# Patient Record
Sex: MALE | Race: WHITE
[De-identification: names, ages, dates, MRNs, and addresses within clinical notes are randomized per-mention and may not be internally consistent; named-entity substitution may affect disease eponyms.]

---

## 2017-08-18 ENCOUNTER — HOSPITAL ENCOUNTER (INPATIENT)
Dept: HOSPITAL 31 - C.ER | Age: 62
LOS: 4 days | Discharge: HOME | DRG: 395 | End: 2017-08-22
Attending: HOSPITALIST | Admitting: HOSPITALIST
Payer: MEDICAID

## 2017-08-18 DIAGNOSIS — D64.9: Primary | ICD-10-CM

## 2017-08-18 DIAGNOSIS — Z91.19: ICD-10-CM

## 2017-08-18 DIAGNOSIS — I10: ICD-10-CM

## 2017-08-18 DIAGNOSIS — F03.90: ICD-10-CM

## 2017-08-18 DIAGNOSIS — D68.9: ICD-10-CM

## 2017-08-18 LAB
ALBUMIN/GLOB SERPL: 1.1 {RATIO} (ref 1–2.1)
ALP SERPL-CCNC: 76 U/L (ref 38–126)
ALT SERPL-CCNC: 21 U/L (ref 21–72)
APTT BLD: 29 SECONDS (ref 21–34)
AST SERPL-CCNC: 25 U/L (ref 17–59)
BASOPHILS # BLD AUTO: 0.1 K/UL (ref 0–0.2)
BASOPHILS NFR BLD: 2.4 % (ref 0–2)
BILIRUB SERPL-MCNC: 1 MG/DL (ref 0.2–1.3)
BUN SERPL-MCNC: 13 MG/DL (ref 9–20)
CALCIUM SERPL-MCNC: 9 MG/DL (ref 8.6–10.4)
CHLORIDE SERPL-SCNC: 105 MMOL/L (ref 98–107)
CO2 SERPL-SCNC: 24 MMOL/L (ref 22–30)
EOSINOPHIL # BLD AUTO: 0.1 K/UL (ref 0–0.7)
EOSINOPHIL NFR BLD: 2 % (ref 0–4)
ERYTHROCYTE [DISTWIDTH] IN BLOOD BY AUTOMATED COUNT: 20.2 % (ref 11.5–14.5)
FOLATE SERPL-MCNC: > 20 NG/ML
GLOBULIN SER-MCNC: 3.6 GM/DL (ref 2.2–3.9)
GLUCOSE SERPL-MCNC: 84 MG/DL (ref 75–110)
HCT VFR BLD CALC: 19.9 % (ref 35–51)
HCYS SERPL-SCNC: 7.3 UMOL/L (ref 6.6–14.8)
INR PPP: 1.4
IRON SERPL-MCNC: 13 UG/DL (ref 49–181)
LYMPHOCYTES # BLD AUTO: 0.9 K/UL (ref 1–4.3)
LYMPHOCYTES NFR BLD AUTO: 22.6 % (ref 20–40)
MCH RBC QN AUTO: 15.4 PG (ref 27–31)
MCHC RBC AUTO-ENTMCNC: 27.7 G/DL (ref 33–37)
MCV RBC AUTO: 55.7 FL (ref 80–94)
MONOCYTES # BLD: 0.3 K/UL (ref 0–0.8)
MONOCYTES NFR BLD: 8.1 % (ref 0–10)
NRBC BLD AUTO-RTO: 0.1 % (ref 0–2)
PLATELET # BLD: 213 K/UL (ref 130–400)
PMV BLD AUTO: 8.6 FL (ref 7.2–11.7)
POTASSIUM SERPL-SCNC: 3.2 MMOL/L (ref 3.6–5.2)
PROT SERPL-MCNC: 7.7 G/DL (ref 6.3–8.3)
SODIUM SERPL-SCNC: 143 MMOL/L (ref 132–148)
WBC # BLD AUTO: 4 K/UL (ref 4.8–10.8)

## 2017-08-18 PROCEDURE — 30233N1 TRANSFUSION OF NONAUTOLOGOUS RED BLOOD CELLS INTO PERIPHERAL VEIN, PERCUTANEOUS APPROACH: ICD-10-PCS | Performed by: HOSPITALIST
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
## 2017-08-18 NOTE — RAD
HISTORY:

anemia  



COMPARISON:

No prior. 



FINDINGS:



LUNGS:

No active pulmonary disease.



PLEURA:

No significant pleural effusion identified, no pneumothorax apparent.



CARDIOVASCULAR:

Normal.



OSSEOUS STRUCTURES:

No significant abnormalities.



VISUALIZED UPPER ABDOMEN:

Normal.



OTHER FINDINGS:

None.



IMPRESSION:

No active disease.

## 2017-08-18 NOTE — C.PDOC
History Of Present Illness


Patient is a 62 y/o male, accompanied by brother, is referred from the clinic 

for low hemoglobin level. Pt states that he visited the clinic yesterday 

because he was feeling dizzy, and had blood work done there. States he received 

a call from the clinic today, was told his hemoglobin level was 5.8g/dl, and 

told to report to an ER. Denies having history of low hemoglobin in the past. 

Pt also complains of headache at this time. Denies any blood in stool, nausea, 

vomiting, fever, or any other associated symptoms at this time. 





Time Seen by Provider: 08/18/17 10:18


Chief Complaint (Nursing): Abnormal Labs


History Per: Patient


History/Exam Limitations: no limitations


Onset/Duration Of Symptoms: Days


Current Symptoms Are (Timing): Still Present


Recent travel outside of the [place]: No


Additional History Per: Patient





Past Medical History


Reviewed: Historical Data, Nursing Documentation, Vital Signs


Vital Signs: 


 Last Vital Signs











Temp  98.9 F   08/18/17 10:12


 


Pulse  77   08/18/17 10:12


 


Resp  18   08/18/17 10:12


 


BP  133/78   08/18/17 10:12


 


Pulse Ox  99   08/18/17 11:22














- Medical History


PMH: HTN


Family History: States: No Known Family Hx





- Social History


Hx Alcohol Use: No


Hx Substance Use: No





Review Of Systems


Except As Marked, All Systems Reviewed And Found Negative.


Constitutional: Negative for: Fever, Chills


Cardiovascular: Negative for: Chest Pain, Palpitations


Respiratory: Negative for: Cough, Shortness of Breath


Gastrointestinal: Negative for: Nausea, Vomiting, Abdominal Pain, Hematochezia, 

Hematemesis


Skin: Negative for: Rash, Bruising


Neurological: Positive for: Headache, Dizziness.  Negative for: Weakness, 

Numbness





Physical Exam





- Physical Exam


Additional Physical Exam Comments: 


Constitutional: No acute distress.


Head: Normocephalic.  Atraumatic.  


Eyes:  PERRL. Conjunctival pallor.


ENT:  Moist mucous membranes.


Neck:  Supple.


Cardiovascular:  Regular rate. Radial pulses 2+ bilaterally.


Chest: No tenderness.


Respiratory:  Clear to auscultation bilaterally.


GI:  Soft. Nontender. Nondistended. 


Back:  No CVA tenderness.


Musculoskeletal:  No tenderness or swelling of extremities.


Skin:  No rash. 


Neurologic:  Alert, no focal deficit.








ED Course And Treatment





- Laboratory Results


Result Diagrams: 


 08/18/17 10:41





 08/18/17 10:41


O2 Sat by Pulse Oximetry: 99 (RA)


Pulse Ox Interpretation: Normal





Medical Decision Making


Medical Decision Making: 


Plan: Blood work, UA, CXR, EKG





EKG: Normal sinus rhythm at 60 bpm. No ST wave changes. 





CXR


HISTORY:


anemia  





COMPARISON:


No prior. 





FINDINGS:





LUNGS:


No active pulmonary disease.





PLEURA:


No significant pleural effusion identified, no pneumothorax apparent.





CARDIOVASCULAR:


Normal.





OSSEOUS STRUCTURES:


No significant abnormalities.





VISUALIZED UPPER ABDOMEN:


Normal.





OTHER FINDINGS:


None.





IMPRESSION:


No active disease.





Guaiac negative. Patient consented for blood transfusion and ordered. 





Disposition


Discussed With Dr.: Alex Brown


Doctor Will See Patient In The: Hospital





- Disposition


Disposition: HOSPITALIZED


Disposition Time: 11:29


Condition: FAIR





- Clinical Impression


Clinical Impression: 


 Symptomatic anemia








- Scribe Statement


The provider has reviewed the documentation as recorded by the Gloryibkellen Martel





All medical record entries made by the Gloryibkellen were at my direction and 

personally dictated by me. I have reviewed the chart and agree that the record 

accurately reflects my personal performance of the history, physical exam, 

medical decision making, and the department course for this patient. I have 

also personally directed, reviewed, and agree with the discharge instructions 

and disposition.

## 2017-08-19 LAB
ALBUMIN/GLOB SERPL: 1.2 {RATIO} (ref 1–2.1)
ALP SERPL-CCNC: 77 U/L (ref 38–126)
ALT SERPL-CCNC: 19 U/L (ref 21–72)
AST SERPL-CCNC: 17 U/L (ref 17–59)
BASOPHILS # BLD AUTO: 0.1 K/UL (ref 0–0.2)
BASOPHILS NFR BLD: 2.1 % (ref 0–2)
BILIRUB SERPL-MCNC: 0.7 MG/DL (ref 0.2–1.3)
BUN SERPL-MCNC: 11 MG/DL (ref 9–20)
CALCIUM SERPL-MCNC: 8.9 MG/DL (ref 8.6–10.4)
CHLORIDE SERPL-SCNC: 103 MMOL/L (ref 98–107)
CO2 SERPL-SCNC: 25 MMOL/L (ref 22–30)
EOSINOPHIL # BLD AUTO: 0.2 K/UL (ref 0–0.7)
EOSINOPHIL NFR BLD: 4.7 % (ref 0–4)
ERYTHROCYTE [DISTWIDTH] IN BLOOD BY AUTOMATED COUNT: 23 % (ref 11.5–14.5)
FOLATE SERPL-MCNC: 16.3 NG/ML
GLOBULIN SER-MCNC: 3.2 GM/DL (ref 2.2–3.9)
GLUCOSE SERPL-MCNC: 93 MG/DL (ref 75–110)
HCT VFR BLD CALC: 23.8 % (ref 35–51)
LYMPHOCYTES # BLD AUTO: 1.2 K/UL (ref 1–4.3)
LYMPHOCYTES NFR BLD AUTO: 25.9 % (ref 20–40)
MCH RBC QN AUTO: 16.8 PG (ref 27–31)
MCHC RBC AUTO-ENTMCNC: 28.6 G/DL (ref 33–37)
MCV RBC AUTO: 58.7 FL (ref 80–94)
MONOCYTES # BLD: 0.3 K/UL (ref 0–0.8)
MONOCYTES NFR BLD: 7 % (ref 0–10)
NRBC BLD AUTO-RTO: 0 % (ref 0–2)
PLATELET # BLD: 206 K/UL (ref 130–400)
PMV BLD AUTO: 9.3 FL (ref 7.2–11.7)
POTASSIUM SERPL-SCNC: 3.4 MMOL/L (ref 3.6–5.2)
PROT SERPL-MCNC: 7 G/DL (ref 6.3–8.3)
PROT SERPL-MCNC: 7.3 G/DL (ref 6.1–8.1)
SODIUM SERPL-SCNC: 140 MMOL/L (ref 132–148)
WBC # BLD AUTO: 4.8 K/UL (ref 4.8–10.8)

## 2017-08-19 NOTE — CP.PCM.PN
<Xu Denise H - Last Filed: 08/19/17 21:07>





Subjective





- Date & Time of Evaluation


Date of Evaluation: 08/19/17


Time of Evaluation: 11:40





- Subjective


Subjective: 











Dr. Brown service:





He is in bed receiving a blood transfusion.  He has no complaints of fatigue, 

chest pain, shortness of breath, blood in stool, hematuria, fever or chills.   





Objective





- Vital Signs/Intake and Output


Vital Signs (last 24 hours): 


 











Temp Pulse Resp BP Pulse Ox


 


 97.6 F   60   20   154/88 H  100 


 


 08/19/17 15:15  08/19/17 15:15  08/19/17 15:15  08/19/17 15:15  08/19/17 15:15








Intake and Output: 


 











 08/19/17 08/20/17





 18:59 06:59


 


Intake Total 675 


 


Balance 675 














- Medications


Medications: 


 Current Medications





Acetaminophen (Tylenol 325mg Tab)  650 mg PO Q6 PRN


   PRN Reason: Fever >100.4 F


Famotidine (Pepcid)  20 mg PO BID [facility]


   Last Admin: 08/19/17 17:41 Dose:  20 mg











- Labs


Labs: 


 





 08/19/17 06:32 





 08/19/17 06:32 





 











PT  15.7 SECONDS (9.7-12.2)  H  08/18/17  10:41    


 


INR  1.4   08/18/17  10:41    


 


APTT  29 SECONDS (21-34)   08/18/17  10:41    














- Constitutional


Appears: Non-toxic, No Acute Distress





- Head Exam


Head Exam: NORMAL INSPECTION





- Eye Exam


Eye Exam: Normal appearance, PERRL.  absent: Nystagmus, Scleral icterus


Pupil Exam: NORMAL ACCOMODATION





- Respiratory Exam


Respiratory Exam: Clear to Ausculation Bilateral.  absent: Rales, Rhonchi, 

Wheezes





- Cardiovascular Exam


Cardiovascular Exam: REGULAR RHYTHM, RRR, +S1, +S2.  absent: Gallop, Rubs





- GI/Abdominal Exam


GI & Abdominal Exam: Soft, Normal Bowel Sounds.  absent: Tenderness





- Extremities Exam


Extremities Exam: Normal Inspection.  absent: Pedal Edema





- Back Exam


Back Exam: NORMAL INSPECTION





- Neurological Exam


Neurological Exam: Alert





- Psychiatric Exam


Psychiatric exam: Normal Affect, Normal Mood





- Skin


Skin Exam: Pallor.  absent: Normal Color





Assessment and Plan





- Assessment and Plan (Free Text)


Assessment: 





Anemia


8/19: Received 2 units today, follow up cbc, and Dr. White's recs.  Iron is low 

and so is % saturation, suggest iron deficiency anemia.  





Hgb in the ED 5.5


-Patient refused blood transfusion


-Occult blood negative in the ED, repeats pending


-Heme/onc consult, Dr. White help appreciated


-Follow up Iron studies, homocysteine, B12, folate, protein electrophoresis





HTN


-Noncompliance to home meds


-Hold home hypertensive, BP is in the normal range





Prophylactic measures


-Pepcid


-SCD


-Tylenol


-Full code





Plan: 





Anemia


-Hgb in the ED 5.5


-Patient refused blood transfusion


-Occult blood negative in the ED, repeats pending


-Heme/onc consult, Dr. White help appreciated


-Follow up Iron studies, homocysteine, B12, folate, protein electrophoresis





HTN


-Noncompliance to home meds


-Hold home hypertensive, BP is in the normal range





Prophylactic measures


-Pepcid


-SCD


-Tylenol


-Full code





<Alex Brown - Last Filed: 08/20/17 14:35>





Objective





- Vital Signs/Intake and Output


Vital Signs (last 24 hours): 


 











Temp Pulse Resp BP Pulse Ox


 


 98 F   74   20   166/89 H  100 


 


 08/20/17 09:32  08/20/17 09:32  08/20/17 09:32  08/20/17 09:32  08/20/17 09:32








Intake and Output: 


 











 08/20/17 08/20/17





 06:59 18:59


 


Intake Total 550 


 


Output Total 1100 


 


Balance -550 














- Medications


Medications: 


 Current Medications





Acetaminophen (Tylenol 325mg Tab)  650 mg PO Q6 PRN


   PRN Reason: Fever >100.4 F


Famotidine (Pepcid)  20 mg PO BID [facility]


   Last Admin: 08/20/17 11:00 Dose:  20 mg


Ferric Sodium Gluconate Complex (Ferrlecit)  125 mg IVPB DAILY [facility]


   Last Admin: 08/20/17 11:01 Dose:  125 mg


Haloperidol Lactate (Haldol)  2 mg IM Q6H PRN


   PRN Reason: Agitation


   Last Admin: 08/20/17 11:49 Dose:  2 mg











- Labs


Labs: 


 





 08/20/17 11:41 





 08/20/17 11:41 





 











PT  15.7 SECONDS (9.7-12.2)  H  08/18/17  10:41    


 


INR  1.4   08/18/17  10:41    


 


APTT  29 SECONDS (21-34)   08/18/17  10:41    














Attending/Attestation





- Attestation


I have personally seen and examined this patient.: Yes


I have fully participated in the care of the patient.: Yes


I have reviewed all pertinent clinical information, including history, physical 

exam and plan: Yes


Notes (Text): 





08/20/17 14:35


Patient was seen and examined at bedside


Receiving 2 units of blood transfusion


We will follow up CBC


Anemia workup in progress


We have requested a hematology evaluation


Discussed the plan of care with the resident and agree with the assessment and 

plan documented.
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
## 2017-08-19 NOTE — CP.PCM.PN
Subjective





- Date & Time of Evaluation


Date of Evaluation: 08/19/17


Time of Evaluation: 17:00





- Subjective


Subjective: 





Feeling better





Objective





- Vital Signs/Intake and Output


Vital Signs (last 24 hours): 


 











Temp Pulse Resp BP Pulse Ox


 


 97.6 F   60   20   154/88 H  100 


 


 08/19/17 15:15  08/19/17 15:15  08/19/17 15:15  08/19/17 15:15  08/19/17 15:15








Intake and Output: 


 











 08/19/17 08/20/17





 18:59 06:59


 


Intake Total 675 


 


Balance 675 














- Medications


Medications: 


 Current Medications





Acetaminophen (Tylenol 325mg Tab)  650 mg PO Q6 PRN


   PRN Reason: Fever >100.4 F


Famotidine (Pepcid)  20 mg PO BID [facility]


   Last Admin: 08/19/17 17:41 Dose:  20 mg


Ferric Sodium Gluconate Complex (Ferrlecit)  125 mg IVPB DAILY [facility]











- Labs


Labs: 


 





 08/19/17 06:32 





 08/19/17 06:32 





 











PT  15.7 SECONDS (9.7-12.2)  H  08/18/17  10:41    


 


INR  1.4   08/18/17  10:41    


 


APTT  29 SECONDS (21-34)   08/18/17  10:41    














- Head Exam


Head Exam: ATRAUMATIC





- Eye Exam


Eye Exam: Normal appearance





- ENT Exam


ENT Exam: Mucous Membranes Dry





- Respiratory Exam


Respiratory Exam: NORMAL BREATHING PATTERN





- Cardiovascular Exam


Cardiovascular Exam: +S1, +S2





- GI/Abdominal Exam


GI & Abdominal Exam: Normal Bowel Sounds





- Extremities Exam


Extremities Exam: Normal Inspection





Assessment and Plan


(1) Anemia


Assessment & Plan: 


work up consistent with iron deficiency


FOBT negative; outpatient GI w/u


recommend checking UA


will start IV iron


transfusion support for goal HGB > 8





Status: Acute   





(2) Coagulopathy


Status: Acute

## 2017-08-20 LAB
ALBUMIN/GLOB SERPL: 1.1 {RATIO} (ref 1–2.1)
ALP SERPL-CCNC: 92 U/L (ref 38–126)
ALT SERPL-CCNC: 26 U/L (ref 21–72)
AST SERPL-CCNC: 20 U/L (ref 17–59)
BASOPHILS # BLD AUTO: 0.1 K/UL (ref 0–0.2)
BASOPHILS NFR BLD: 1.3 % (ref 0–2)
BILIRUB SERPL-MCNC: 1.3 MG/DL (ref 0.2–1.3)
BUN SERPL-MCNC: 11 MG/DL (ref 9–20)
CALCIUM SERPL-MCNC: 9.9 MG/DL (ref 8.6–10.4)
CHLORIDE SERPL-SCNC: 101 MMOL/L (ref 98–107)
CO2 SERPL-SCNC: 26 MMOL/L (ref 22–30)
EOSINOPHIL # BLD AUTO: 0.1 K/UL (ref 0–0.7)
EOSINOPHIL NFR BLD: 1.3 % (ref 0–4)
ERYTHROCYTE [DISTWIDTH] IN BLOOD BY AUTOMATED COUNT: 26.5 % (ref 11.5–14.5)
GLOBULIN SER-MCNC: 4.3 GM/DL (ref 2.2–3.9)
GLUCOSE SERPL-MCNC: 84 MG/DL (ref 75–110)
HCT VFR BLD CALC: 28.9 % (ref 35–51)
LYMPHOCYTES # BLD AUTO: 1.3 K/UL (ref 1–4.3)
LYMPHOCYTES NFR BLD AUTO: 19.7 % (ref 20–40)
MCH RBC QN AUTO: 18.1 PG (ref 27–31)
MCHC RBC AUTO-ENTMCNC: 29.9 G/DL (ref 33–37)
MCV RBC AUTO: 60.7 FL (ref 80–94)
MONOCYTES # BLD: 0.4 K/UL (ref 0–0.8)
MONOCYTES NFR BLD: 6.1 % (ref 0–10)
NRBC BLD AUTO-RTO: 0 % (ref 0–2)
PLATELET # BLD: 254 K/UL (ref 130–400)
PMV BLD AUTO: 8.7 FL (ref 7.2–11.7)
POTASSIUM SERPL-SCNC: 3.7 MMOL/L (ref 3.6–5.2)
PROT SERPL-MCNC: 8.9 G/DL (ref 6.3–8.3)
SODIUM SERPL-SCNC: 143 MMOL/L (ref 132–148)
WBC # BLD AUTO: 6.8 K/UL (ref 4.8–10.8)

## 2017-08-20 RX ADMIN — SODIUM FERRIC GLUCONATE COMPLEX SCH MG: 12.5 INJECTION INTRAVENOUS at 11:01

## 2017-08-20 NOTE — PCM.PSYCH
Initial Psychiatric Evaluation





- Initial Psychiatric Evaluation


Type of Admission: Voluntary


Legal Status: Capacity


Chief Complaint (in patient's own words): 





I am home, waiting for my kids to come.


Current Medications: 





Active Medications











Generic Name Dose Route Start Last Admin





  Trade Name Freq  PRN Reason Stop Dose Admin


 


Acetaminophen  650 mg  08/18/17 15:59  





  Tylenol 325mg Tab  PO   





  Q6 PRN   





  Fever >100.4 F   


 


Famotidine  20 mg  08/18/17 18:00  08/20/17 18:26





  Pepcid  PO   20 mg





  BID [facility]   Administration


 


Ferric Sodium Gluconate Complex  125 mg  08/19/17 21:30  08/20/17 11:01





  Ferrlecit  IVPB   125 mg





  DAILY [facility]   Administration


 


Haloperidol Lactate  2 mg  08/20/17 10:44  08/20/17 11:49





  Haldol  IM   2 mg





  Q6H PRN   Administration





  Agitation   














Past Psychiatric History





- Past Psychiatric History


Pertinent Medical Hx (Current Medical&Sleep Prob, Allergies): 





 Allergies











Allergy/AdvReac Type Severity Reaction Status Date / Time


 


No Known Allergies Allergy   Verified 08/18/17 10:17








 





Amlodipine Besylate/Benazepril [Lotrel 10 mg-40 mg] 1 cap PO DAILY 08/18/17 


Propranolol [Inderal] 40 mg PO DAILY 08/18/17

## 2017-08-20 NOTE — CP.PCM.PN
<Alex Brown M - Last Filed: 08/20/17 16:46>





Objective





- Vital Signs/Intake and Output


Vital Signs (last 24 hours): 


 











Temp Pulse Resp BP Pulse Ox


 


 98.4 F   70   18   152/78 H  98 


 


 08/20/17 15:39  08/20/17 15:39  08/20/17 15:39  08/20/17 15:39  08/20/17 15:39








Intake and Output: 


 











 08/20/17 08/20/17





 06:59 18:59


 


Intake Total 550 


 


Output Total 1100 


 


Balance -550 














- Medications


Medications: 


 Current Medications





Acetaminophen (Tylenol 325mg Tab)  650 mg PO Q6 PRN


   PRN Reason: Fever >100.4 F


Famotidine (Pepcid)  20 mg PO BID [facility]


   Last Admin: 08/20/17 11:00 Dose:  20 mg


Ferric Sodium Gluconate Complex (Ferrlecit)  125 mg IVPB DAILY [facility]


   Last Admin: 08/20/17 11:01 Dose:  125 mg


Haloperidol Lactate (Haldol)  2 mg IM Q6H PRN


   PRN Reason: Agitation


   Last Admin: 08/20/17 11:49 Dose:  2 mg











- Labs


Labs: 


 





 08/20/17 11:41 





 08/20/17 11:41 





 











PT  15.7 SECONDS (9.7-12.2)  H  08/18/17  10:41    


 


INR  1.4   08/18/17  10:41    


 


APTT  29 SECONDS (21-34)   08/18/17  10:41    














Attending/Attestation





- Attestation


I have personally seen and examined this patient.: Yes


I have fully participated in the care of the patient.: Yes


I have reviewed all pertinent clinical information, including history, physical 

exam and plan: Yes


Notes (Text): 





08/20/17 16:47


Patient was seen and examined at bedside today


Patient appears confused


He is status post 2 units of PRBC. Follow-up a CBC


We will transfuse further if needed


The confusion and change in mental status appears to be secondary to hospital 

delirium


We will monitor the patientand order Haldol as needed


We will also request psychiatry evaluation for the patient


I discussed the plan of care with the resident and agree with the assessment 

and plan documented above.





<Xu Denise H - Last Filed: 08/20/17 21:32>





Subjective





- Date & Time of Evaluation


Date of Evaluation: 08/20/17


Time of Evaluation: 10:00





- Subjective


Subjective: 





Dr. Brown service:





Patient seen in room.  He is very confused and disoriented.  He does not know 

where is at and why is here.  He is also unoriented the year, month, and day of 

the week.  Unable to obtain history from patient.  Spoke with family in room 

who says patient is often confused at home.  They said he lives alone although 

he has a wife and children in [place].  





Objective





- Vital Signs/Intake and Output


Vital Signs (last 24 hours): 


 











Temp Pulse Resp BP Pulse Ox


 


 98 F   74   20   166/89 H  100 


 


 08/20/17 09:32  08/20/17 09:32  08/20/17 09:32  08/20/17 09:32  08/20/17 09:32








Intake and Output: 


 











 08/20/17 08/20/17





 06:59 18:59


 


Intake Total 550 


 


Output Total 1100 


 


Balance -550 














- Medications


Medications: 


 Current Medications





Acetaminophen (Tylenol 325mg Tab)  650 mg PO Q6 PRN


   PRN Reason: Fever >100.4 F


Famotidine (Pepcid)  20 mg PO BID [facility]


   Last Admin: 08/20/17 11:00 Dose:  20 mg


Ferric Sodium Gluconate Complex (Ferrlecit)  125 mg IVPB DAILY [facility]


   Last Admin: 08/20/17 11:01 Dose:  125 mg


Haloperidol Lactate (Haldol)  2 mg IM Q6H PRN


   PRN Reason: Agitation


   Last Admin: 08/20/17 11:49 Dose:  2 mg











- Labs


Labs: 


 





 08/20/17 11:41 





 08/20/17 11:41 





 











PT  15.7 SECONDS (9.7-12.2)  H  08/18/17  10:41    


 


INR  1.4   08/18/17  10:41    


 


APTT  29 SECONDS (21-34)   08/18/17  10:41    














- Constitutional


Appears: Confused





- Eye Exam


Eye Exam: Normal appearance





- Respiratory Exam


Respiratory Exam: Clear to Ausculation Bilateral.  absent: Rales, Rhonchi, 

Wheezes





- Cardiovascular Exam


Cardiovascular Exam: REGULAR RHYTHM, RRR, +S1, +S2.  absent: Gallop, Rubs





- GI/Abdominal Exam


GI & Abdominal Exam: Soft, Normal Bowel Sounds.  absent: Tenderness





- Extremities Exam


Extremities Exam: Normal Inspection.  absent: Pedal Edema





- Neurological Exam


Neurological Exam: absent: Oriented x3





- Psychiatric Exam


Psychiatric exam: Anxious





- Skin


Skin Exam: Warm.  absent: Normal Color





Assessment and Plan





- Assessment and Plan (Free Text)


Assessment: 





Delirium:


Psych consulted, most likely hospital acquired delrium.  Haldol prn.  Spoke 

with family who said he has been diagnosed with dementia in the past. 





Anemia


8/20:  Hbg is no 8.4, IV Ferrict for low iron


8/19: Received 2 units today, follow up cbc, and Dr. White's recs.  Iron is low 

and so is % saturation, suggest iron deficiency anemia.  





Hgb in the ED 5.5


-Patient refused blood transfusion


-Occult blood negative in the ED, repeats pending


-Heme/onc consult, Dr. White help appreciated


-Follow up Iron studies, homocysteine, B12, folate, protein electrophoresis





HTN


-Noncompliance to home meds


-Hold home hypertensive, BP is in the normal range





Prophylactic measures


-Pepcid


-SCD


-Tylenol


-Full code

## 2017-08-21 LAB
ALBUMIN/GLOB SERPL: 1.1 {RATIO} (ref 1–2.1)
ALP SERPL-CCNC: 80 U/L (ref 38–126)
ALT SERPL-CCNC: 23 U/L (ref 21–72)
AST SERPL-CCNC: 18 U/L (ref 17–59)
BASOPHILS # BLD AUTO: 0.1 K/UL (ref 0–0.2)
BASOPHILS NFR BLD: 1.8 % (ref 0–2)
BILIRUB SERPL-MCNC: 0.9 MG/DL (ref 0.2–1.3)
BUN SERPL-MCNC: 11 MG/DL (ref 9–20)
CALCIUM SERPL-MCNC: 9.5 MG/DL (ref 8.6–10.4)
CHLORIDE SERPL-SCNC: 101 MMOL/L (ref 98–107)
CO2 SERPL-SCNC: 26 MMOL/L (ref 22–30)
EOSINOPHIL # BLD AUTO: 0.2 K/UL (ref 0–0.7)
EOSINOPHIL NFR BLD: 3.1 % (ref 0–4)
ERYTHROCYTE [DISTWIDTH] IN BLOOD BY AUTOMATED COUNT: 26.2 % (ref 11.5–14.5)
GLOBULIN SER-MCNC: 3.7 GM/DL (ref 2.2–3.9)
GLUCOSE SERPL-MCNC: 98 MG/DL (ref 75–110)
HCT VFR BLD CALC: 26.3 % (ref 35–51)
LYMPHOCYTES # BLD AUTO: 1.5 K/UL (ref 1–4.3)
LYMPHOCYTES NFR BLD AUTO: 25.2 % (ref 20–40)
MCH RBC QN AUTO: 18.2 PG (ref 27–31)
MCHC RBC AUTO-ENTMCNC: 29.9 G/DL (ref 33–37)
MCV RBC AUTO: 60.9 FL (ref 80–94)
MONOCYTES # BLD: 0.5 K/UL (ref 0–0.8)
MONOCYTES NFR BLD: 9.5 % (ref 0–10)
NRBC BLD AUTO-RTO: 0 % (ref 0–2)
PLATELET # BLD: 235 K/UL (ref 130–400)
PMV BLD AUTO: 8.8 FL (ref 7.2–11.7)
POTASSIUM SERPL-SCNC: 4 MMOL/L (ref 3.6–5.2)
PROT SERPL-MCNC: 7.7 G/DL (ref 6.3–8.3)
SODIUM SERPL-SCNC: 142 MMOL/L (ref 132–148)
WBC # BLD AUTO: 5.8 K/UL (ref 4.8–10.8)

## 2017-08-21 RX ADMIN — SODIUM FERRIC GLUCONATE COMPLEX SCH MG: 12.5 INJECTION INTRAVENOUS at 10:21
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
## 2017-08-22 VITALS
TEMPERATURE: 98.2 F | RESPIRATION RATE: 18 BRPM | DIASTOLIC BLOOD PRESSURE: 86 MMHG | HEART RATE: 65 BPM | OXYGEN SATURATION: 98 % | SYSTOLIC BLOOD PRESSURE: 157 MMHG

## 2017-08-22 LAB
BASOPHILS # BLD AUTO: 0.1 K/UL (ref 0–0.2)
BASOPHILS NFR BLD: 2 % (ref 0–2)
BETA1 GLOB FLD ELPH-MCNC: 0.5 G/DL (ref 0.4–0.6)
BETA2 GLOB FLD ELPH-MCNC: 0.3 G/DL (ref 0.2–0.5)
EOSINOPHIL # BLD AUTO: 0.3 K/UL (ref 0–0.7)
EOSINOPHIL NFR BLD: 5.2 % (ref 0–4)
ERYTHROCYTE [DISTWIDTH] IN BLOOD BY AUTOMATED COUNT: 26.3 % (ref 11.5–14.5)
GAMMA GLOB SERPL ELPH-MCNC: 1.6 G/DL (ref 0.8–1.7)
HCT VFR BLD CALC: 26.8 % (ref 35–51)
LYMPHOCYTES # BLD AUTO: 1.4 K/UL (ref 1–4.3)
LYMPHOCYTES NFR BLD AUTO: 23.3 % (ref 20–40)
MCH RBC QN AUTO: 18.1 PG (ref 27–31)
MCHC RBC AUTO-ENTMCNC: 29.6 G/DL (ref 33–37)
MCV RBC AUTO: 61.1 FL (ref 80–94)
MONOCYTES # BLD: 0.6 K/UL (ref 0–0.8)
MONOCYTES NFR BLD: 9.6 % (ref 0–10)
NRBC BLD AUTO-RTO: 0 % (ref 0–2)
PLATELET # BLD: 215 K/UL (ref 130–400)
PMV BLD AUTO: 8.6 FL (ref 7.2–11.7)
WBC # BLD AUTO: 6 K/UL (ref 4.8–10.8)

## 2017-08-22 RX ADMIN — SODIUM FERRIC GLUCONATE COMPLEX SCH MG: 12.5 INJECTION INTRAVENOUS at 10:08

## 2017-08-22 NOTE — CP.PCM.PN
Subjective





- Date & Time of Evaluation


Date of Evaluation: 08/21/17


Time of Evaluation: 19:00





- Subjective


Subjective: 





No complaints





Objective





- Vital Signs/Intake and Output


Vital Signs (last 24 hours): 


 











Temp Pulse Resp BP Pulse Ox


 


 98.0 F   58 L  20   145/83   97 


 


 08/22/17 00:05  08/22/17 00:05  08/22/17 00:05  08/22/17 00:05  08/22/17 00:05











- Medications


Medications: 


 Current Medications





Acetaminophen (Tylenol 325mg Tab)  650 mg PO Q6 PRN


   PRN Reason: Fever >100.4 F


   Last Admin: 08/21/17 10:21 Dose:  650 mg


Famotidine (Pepcid)  20 mg PO BID [facility]


   Last Admin: 08/21/17 17:04 Dose:  20 mg


Ferric Sodium Gluconate Complex (Ferrlecit)  125 mg IVPB DAILY [facility]


   Last Admin: 08/21/17 10:21 Dose:  125 mg


Haloperidol Lactate (Haldol)  2 mg IM Q6H PRN


   PRN Reason: Agitation


   Last Admin: 08/20/17 11:49 Dose:  2 mg











- Labs


Labs: 


 





 08/21/17 12:21 





 08/21/17 12:21 





 











PT  15.7 SECONDS (9.7-12.2)  H  08/18/17  10:41    


 


INR  1.4   08/18/17  10:41    


 


APTT  29 SECONDS (21-34)   08/18/17  10:41    














- Head Exam


Head Exam: ATRAUMATIC





- Eye Exam


Eye Exam: Normal appearance





- ENT Exam


ENT Exam: Mucous Membranes Dry





- Respiratory Exam


Respiratory Exam: NORMAL BREATHING PATTERN





- Cardiovascular Exam


Cardiovascular Exam: +S1, +S2





- Extremities Exam


Extremities Exam: Normal Inspection





Assessment and Plan


(1) Anemia


Assessment & Plan: 


iron deficiency anemia


outpatient GI work up


IV ferrlecit


transfusion support PRN


Status: Acute   





(2) Coagulopathy


Status: Acute
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
## 2017-08-22 NOTE — CARD
--------------- APPROVED REPORT --------------





EKG Measurement

Heart [id number]

[id number]

[id number]

[id number]

[id number]



<Conclusion>

Sinus bradycardia

Otherwise normal ECG

## 2017-11-11 ENCOUNTER — HOSPITAL ENCOUNTER (EMERGENCY)
Dept: HOSPITAL 31 - C.ER | Age: 62
Discharge: HOME | End: 2017-11-11
Payer: MEDICAID

## 2017-11-11 VITALS — OXYGEN SATURATION: 99 % | RESPIRATION RATE: 18 BRPM | TEMPERATURE: 97.5 F

## 2017-11-11 VITALS — HEART RATE: 59 BPM | DIASTOLIC BLOOD PRESSURE: 80 MMHG | SYSTOLIC BLOOD PRESSURE: 139 MMHG

## 2017-11-11 DIAGNOSIS — R51: Primary | ICD-10-CM
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
## 2017-11-11 DIAGNOSIS — S05.12XA: ICD-10-CM

## 2017-11-11 NOTE — CT
PROCEDURE:  CT HEAD WITHOUT CONTRAST.



HISTORY:

Headache 



COMPARISON:

None available. 



TECHNIQUE:

Axial computed tomography images were obtained through the head/brain 

without intravenous contrast.  



Radiation dose:



Total exam DLP = 950.50 mGy-cm.



This CT exam was performed using one or more of the following dose 

reduction techniques: Automated exposure control, adjustment of the 

mA and/or kV according to patient size, and/or use of iterative 

reconstruction technique.



FINDINGS:



HEMORRHAGE:

No intracranial hemorrhage. 



BRAIN:

Gray-white matter differentiation is preserved.  There is no mass, 

mass effect or abnormal extra-axial fluid collection.



VENTRICLES:

There is moderate age-related global parenchymal volume loss and 

proportionate enlargement of the ventricles and cortical sulci. 



CALVARIUM:

The skull base and calvarium are normal.



PARANASAL SINUSES:

Predominantly clear.



MASTOID AIR CELLS:

Predominantly clear.



OTHER FINDINGS:

None.



IMPRESSION:

No acute intracranial abnormality.  



Moderate age-related global parenchymal volume loss.

## 2017-11-11 NOTE — C.PDOC
History Of Present Illness





60 y/o male with a hx of HTN, c/o headache, left eye redness, and pain after 

waking up this morning. Patient does not recall having trauma to the left eye. 

No fever, chills, or URI symptoms. pt reports h/o of previous tbi. 





Time Seen by Provider: 11/11/17 13:06


Chief Complaint (Nursing): Eye Problem


History Per: Patient


History/Exam Limitations: no limitations


Onset/Duration Of Symptoms: Hrs (This morning)


Current Symptoms Are (Timing): Still Present


Injury To Eye?: No


Severity: Mild


Quality: "Pain"


Associated Symptoms: Pain.  denies: Decreased Vision, Discharge From Eye


Recent travel outside of the [place]: No


Additional History Per: Patient





Past Medical History


Reviewed: Historical Data, Nursing Documentation, Vital Signs


Vital Signs: 


 Last Vital Signs











Temp  97.5 F L  11/11/17 12:55


 


Pulse  59 L  11/11/17 14:16


 


Resp  18   11/11/17 14:16


 


BP  139/80   11/11/17 14:16


 


Pulse Ox  99   11/11/17 14:16














- Medical History


PMH: Anemia, HTN


   Denies: Chronic Kidney Disease





- [employer] Procedures








TRANSFUSE NONAUT RED BLOOD CELLS IN PERIPH VEIN, PERC (08/18/17)








Family History: States: Unknown Family Hx





- Social History


Hx Alcohol Use: No


Hx Substance Use: No





- Immunization History


Hx Tetanus Toxoid Vaccination: Yes


Hx Influenza Vaccination: No


Hx Pneumococcal Vaccination: No





Review Of Systems


Except As Marked, All Systems Reviewed And Found Negative.


Constitutional: Negative for: Fever, Chills


Eyes: Positive for: Pain (LEft), Redness (left)


ENT: Negative for: Nose Discharge, Nose Congestion, Throat Pain


Respiratory: Negative for: Cough


Skin: Negative for: Rash


Neurological: Positive for: Headache.  Negative for: Weakness, Numbness





Physical Exam





- Physical Exam


Appears: Non-toxic, No Acute Distress


Skin: Warm, Dry


Head: Atraumatic, Normacephalic


Eye(s): bilateral: PERRL, EOMI, right: Normal Inspection, left: Other (Erythema 

to the left eye. No discharge.)


Ear(s): Bilateral: Normal


Oral Mucosa: Moist


Throat: Normal, No Erythema


Chest: Symmetrical


Cardiovascular: Rhythm Regular, No Murmur


Respiratory: Normal Breath Sounds, No Rales, No Rhonchi, No Wheezing


Gastrointestinal/Abdominal: Soft, No Tenderness


Neurological/Psych: Oriented x3, Normal Motor, Normal Sensation





ED Course And Treatment


O2 Sat by Pulse Oximetry: 99 (RA)


Pulse Ox Interpretation: Normal





Medical Decision Making


Medical Decision Making: 


suspect subconjuctival hematoma, will check visual acuity, ct head, ocular 

pressure. 


Plans:


* CT Head w/o


* Tylenol


* Tetracaine


visual acuity to left eye limited 2/2 previous tbi, unable to follow 

instructions-. ocular pressure 15 (95%)





Disposition





- Disposition


Referrals: 


Michael Pan [Staff Provider] - 


Paco Ron MD [Staff Provider] - 


Disposition: HOME/ ROUTINE


Disposition Time: 14:14


Condition: STABLE


Additional Instructions: 


please follow up with your doctor. return to er with worsening symptoms or 

concerns. 


Instructions:  Subconjunctival Hemorrhage (ED), Acute Headache (ED)


Forms:  [facility] (English)





- Clinical Impression


Clinical Impression: 


 Headache, Subconjunctival hematoma








- Scribe Statement


The provider has reviewed the documentation as recorded by the Scribe





Zara hawkins





All medical record entries made by the Scribe were at my direction and 

personally dictated by me. I have reviewed the chart and agree that the record 

accurately reflects my personal performance of the history, physical exam, 

medical decision making, and the department course for this patient. I have 

also personally directed, reviewed, and agree with the discharge instructions 

and disposition.

## 2018-06-30 ENCOUNTER — HOSPITAL ENCOUNTER (EMERGENCY)
Dept: HOSPITAL 31 - C.ER | Age: 63
Discharge: HOME | End: 2018-06-30
Payer: MEDICAID

## 2018-06-30 VITALS
TEMPERATURE: 98 F | HEART RATE: 79 BPM | RESPIRATION RATE: 20 BRPM | SYSTOLIC BLOOD PRESSURE: 134 MMHG | DIASTOLIC BLOOD PRESSURE: 79 MMHG

## 2018-06-30 VITALS — OXYGEN SATURATION: 98 %

## 2018-06-30 DIAGNOSIS — S01.81XA: Primary | ICD-10-CM
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
## 2018-06-30 DIAGNOSIS — S02.2XXB: ICD-10-CM

## 2018-06-30 DIAGNOSIS — I10: ICD-10-CM

## 2018-06-30 DIAGNOSIS — S01.21XA: ICD-10-CM

## 2018-06-30 PROCEDURE — 70450 CT HEAD/BRAIN W/O DYE: CPT

## 2018-06-30 PROCEDURE — 12013 RPR F/E/E/N/L/M 2.6-5.0 CM: CPT

## 2018-06-30 PROCEDURE — 70480 CT ORBIT/EAR/FOSSA W/O DYE: CPT

## 2018-06-30 PROCEDURE — 99285 EMERGENCY DEPT VISIT HI MDM: CPT

## 2018-06-30 PROCEDURE — 96365 THER/PROPH/DIAG IV INF INIT: CPT
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
## 2018-09-27 ENCOUNTER — HOSPITAL ENCOUNTER (EMERGENCY)
Dept: HOSPITAL 31 - C.ER | Age: 63
Discharge: HOME | End: 2018-09-27
Payer: MEDICAID

## 2018-09-27 VITALS
OXYGEN SATURATION: 97 % | RESPIRATION RATE: 18 BRPM | TEMPERATURE: 98.3 F | SYSTOLIC BLOOD PRESSURE: 142 MMHG | DIASTOLIC BLOOD PRESSURE: 88 MMHG | HEART RATE: 67 BPM

## 2018-09-27 DIAGNOSIS — I10: ICD-10-CM

## 2018-09-27 DIAGNOSIS — Z46.6: Primary | ICD-10-CM

## 2018-09-27 NOTE — C.PDOC
History Of Present Illness





62-year-old male, presents to the emergency department requesting removal of 

ruiz catheter. Patient states he had a catheter inserted for urinary retention 

three days ago at [facility] [place]. Today he comes in stating he wants it removed. Pt has 

not followed with urology. No other complaints at this time.


Time Seen by Provider: 09/27/18 09:57


Chief Complaint (Nursing): Male Genitourinary


History Per: Patient


History/Exam Limitations: no limitations





Past Medical History


Reviewed: Historical Data, Nursing Documentation, Vital Signs


Vital Signs: 





                                Last Vital Signs











Temp  98.3 F   09/27/18 10:23


 


Pulse  67   09/27/18 10:23


 


Resp  18   09/27/18 10:23


 


BP  142/88   09/27/18 10:23


 


Pulse Ox  97   09/27/18 10:23














- Medical History


PMH: Anemia, HTN





- [facility] Procedures











TRANSFUSE NONAUT RED BLOOD CELLS IN PERIPH VEIN, PERC (08/18/17)








Family History: States: No Known Family Hx





- Social History


Hx Alcohol Use: No


Hx Substance Use: No





- Immunization History


Hx Tetanus Toxoid Vaccination: Yes


Hx Influenza Vaccination: No


Hx Pneumococcal Vaccination: No





Review Of Systems


Constitutional: Negative for: Fever, Chills


Cardiovascular: Negative for: Chest Pain


Respiratory: Negative for: Shortness of Breath


Gastrointestinal: Negative for: Nausea, Vomiting, Abdominal Pain, Diarrhea


Neurological: Negative for: Weakness, Numbness





Physical Exam





- Physical Exam


Appears: Non-toxic, No Acute Distress


Skin: Warm, Dry, No Rash


Head: Atraumatic, Normacephalic


Eye(s): bilateral: Normal Inspection, PERRL, EOMI


Nose: Normal


Oral Mucosa: Moist


Lips: Normal Appearing


Neck: Normal ROM


Cardiovascular: Rhythm Regular, No Murmur


Respiratory: Normal Breath Sounds, No Accessory Muscle Use


Gastrointestinal/Abdominal: Soft, No Tenderness


Extremity: Normal ROM, No Deformity


Neurological/Psych: Oriented x3, Normal Speech





ED Course And Treatment


O2 Sat by Pulse Oximetry: 97


Pulse Ox Interpretation: Normal (RA)


Progress Note: Patient requesting to have ruiz cath removed.  Ruiz draining 

pink tinged urine.  Abdomen soft.  Ruiz removed.  Patient advised to follow up 

with urology for further evaluation


Reassessment Condition: Unchanged





Disposition


Counseled Patient/Family Regarding: Need For Followup





- Disposition


Referrals: 


Humberto Loredo Jr., MD [Staff Provider] - 


Disposition: HOME/ ROUTINE


Disposition Time: 10:45


Condition: STABLE


Additional Instructions: 


Return to ED if any increase symptoms





Follow up with urology for further evaluation


Instructions:  Uriz Catheter, Male, Urinary Retention





- POA


Present On Arrival: None





- Clinical Impression


Clinical Impression: 


 Encounter for Ruiz catheter removal








- Scribe Statement


The provider has reviewed the documentation as recorded by the Scribe (Walt Pat)








All medical record entries made by the Scribe were at my direction and 

personally dictated by me. I have reviewed the chart and agree that the record 

accurately reflects my personal performance of the history, physical exam, 

medical decision making, and the department course for this patient. I have also

personally directed, reviewed, and agree with the discharge instructions and 

disposition.

## 2019-04-07 ENCOUNTER — HOSPITAL ENCOUNTER (EMERGENCY)
Dept: HOSPITAL 31 - C.ER | Age: 64
Discharge: LEFT BEFORE DISCHARGE | End: 2019-04-07
Payer: MEDICAID

## 2019-04-07 VITALS
OXYGEN SATURATION: 97 % | TEMPERATURE: 97.7 F | RESPIRATION RATE: 20 BRPM | HEART RATE: 86 BPM | DIASTOLIC BLOOD PRESSURE: 97 MMHG | SYSTOLIC BLOOD PRESSURE: 147 MMHG

## 2019-04-07 DIAGNOSIS — R33.8: ICD-10-CM

## 2019-04-07 DIAGNOSIS — I10: ICD-10-CM

## 2019-04-07 DIAGNOSIS — N40.1: Primary | ICD-10-CM

## 2019-04-07 LAB
ALBUMIN SERPL-MCNC: 4.3 G/DL (ref 3.5–5)
ALBUMIN/GLOB SERPL: 1.3 {RATIO} (ref 1–2.1)
ALT SERPL-CCNC: 9 U/L (ref 21–72)
AST SERPL-CCNC: 24 U/L (ref 17–59)
BASOPHILS # BLD AUTO: 0.1 K/UL (ref 0–0.2)
BASOPHILS NFR BLD: 1 % (ref 0–2)
BUN SERPL-MCNC: 13 MG/DL (ref 9–20)
CALCIUM SERPL-MCNC: 9.3 MG/DL (ref 8.6–10.4)
EOSINOPHIL # BLD AUTO: 0.1 K/UL (ref 0–0.7)
EOSINOPHIL NFR BLD: 0.6 % (ref 0–4)
ERYTHROCYTE [DISTWIDTH] IN BLOOD BY AUTOMATED COUNT: 17.3 % (ref 11.5–14.5)
GFR NON-AFRICAN AMERICAN: > 60
HGB BLD-MCNC: 8.4 G/DL (ref 12–18)
LYMPHOCYTES # BLD AUTO: 1.5 K/UL (ref 1–4.3)
LYMPHOCYTES NFR BLD AUTO: 17.7 % (ref 20–40)
MCH RBC QN AUTO: 20.7 PG (ref 27–31)
MCHC RBC AUTO-ENTMCNC: 31.3 G/DL (ref 33–37)
MCV RBC AUTO: 66.1 FL (ref 80–94)
MONOCYTES # BLD: 0.7 K/UL (ref 0–0.8)
MONOCYTES NFR BLD: 8.3 % (ref 0–10)
NEUTROPHILS # BLD: 6 K/UL (ref 1.8–7)
NEUTROPHILS NFR BLD AUTO: 72.4 % (ref 50–75)
NRBC BLD AUTO-RTO: 0 % (ref 0–2)
PLATELET # BLD: 415 K/UL (ref 130–400)
PMV BLD AUTO: 8.2 FL (ref 7.2–11.7)
RBC # BLD AUTO: 4.05 MIL/UL (ref 4.4–5.9)
WBC # BLD AUTO: 8.3 K/UL (ref 4.8–10.8)

## 2019-04-07 NOTE — RAD
Date of service: 



04/07/2019



PROCEDURE:  CHEST RADIOGRAPH, 1 VIEW



HISTORY:

PREOP



COMPARISON:

Comparison is made with 08/07/2018



FINDINGS:



LUNGS:

Clear.



PLEURA:

No pneumothorax or pleural fluid seen.



CARDIOVASCULAR:

 No aortic atherosclerotic calcification present. Normal.



OSSEOUS STRUCTURES:

No significant abnormalities.



VISUALIZED UPPER ABDOMEN:

Normal.



OTHER FINDINGS:

None. 



IMPRESSION:

No active disease.

## 2019-04-07 NOTE — C.PDOC
History Of Present Illness





Pt is a 63 years old male with PMHx of enlarged prostate and prior Ruiz 

catheter insertion, presents to ED for complaints of urinary retention 

associated with suprapubic abdominal pain that began last night at 9PM. Denies 

fever or any other physical complaints.  Sometimes the urine will dribble out. 

Denies Hx of smoking or alcohol use. 








PMD:


* Lupe Barnes





Urologist:


* Sarina Klein











Time Seen by Provider: 04/07/19 08:25


Chief Complaint (Nursing): Male Genitourinary


History Per: Patient


History/Exam Limitations: no limitations


Onset/Duration Of Symptoms: Hrs


Current Symptoms Are (Timing): Still Present


Associated Symptoms: denies: Fever, Chills, Nausea, Vomiting, Diarrhea


Alleviating Factors: None


Recent travel outside of the [place]: No





Past Medical History


Reviewed: Historical Data, Nursing Documentation, Vital Signs


Vital Signs: 





                                Last Vital Signs











Temp  97.7 F   04/07/19 08:08


 


Pulse  86   04/07/19 08:08


 


Resp  20   04/07/19 08:08


 


BP  147/97 H  04/07/19 08:08


 


Pulse Ox  97   04/07/19 08:08














- Medical History


PMH: Anemia, HTN


   Denies: Chronic Kidney Disease





- [facility] Procedures











TRANSFUSE NONAUT RED BLOOD CELLS IN PERIPH VEIN, PERC (08/18/17)








Family History: States: Unknown Family Hx





- Social History


Hx Alcohol Use: No


Hx Substance Use: No





- Immunization History


Hx Tetanus Toxoid Vaccination: Yes


Hx Influenza Vaccination: No


Hx Pneumococcal Vaccination: No





Review Of Systems


Except As Marked, All Systems Reviewed And Found Negative.


Constitutional: Negative for: Fever, Chills


Respiratory: Negative for: Shortness of Breath


Gastrointestinal: Positive for: Abdominal Pain (suprapubic pain).  Negative for:

Nausea, Vomiting, Diarrhea


Genitourinary: Positive for: Other (Urinary retention )


Skin: Negative for: Rash


Neurological: Negative for: Weakness, Numbness





Physical Exam





- Physical Exam


Appears: Non-toxic, Other (seems uncomfortable)


Skin: Normal Color, Warm, Dry, No Rash


Head: Atraumatic, Normacephalic


Eye(s): bilateral: Normal Inspection, EOMI


Ear(s): Bilateral: Normal


Nose: Normal


Oral Mucosa: Moist


Tongue: Normal Appearing


Lips: Normal Appearing


Throat: Normal


Neck: Normal ROM, Supple


Chest: Symmetrical, No Tenderness


Cardiovascular: Rhythm Regular, No Murmur


Respiratory: Normal Breath Sounds, No Rales, No Rhonchi, No Wheezing


Gastrointestinal/Abdominal: Bowel Sounds, Soft, Tenderness (Suprapubic )


Rectal: Deferred


Back: Normal Inspection


Extremity: Normal ROM


Extremity: Bilateral: Atraumatic, Normal Color And Temperature, Normal ROM


Pulses: Left Radial: Normal, Right Radial: Normal


Neurological/Psych: Oriented x3, Normal Speech





ED Course And Treatment





- Laboratory Results


Result Diagrams: 


                                 04/07/19 09:18





                                 04/07/19 09:18


O2 Sat by Pulse Oximetry: 97 (RA)


Pulse Ox Interpretation: Normal





Medical Decision Making


Medical Decision Making: 








Initial Impression:  Urinary retention


Initial Plan:  Will attempt to place ruiz





Progress note:  Nursing unsucessful at placing ruiz.  I attempted to place 

ruiz unsuccessfully 3 times. 





8:55:


* Spoke with MONIE Wheat (Urology on call), will be in ER in 30-40 minutes. 








9:25:


* Spoke with Dr. Bustillos which stated he will perform procedure in operating 

  room.  Will be less likely to induce trauma if done in controlled setting in 

  cysto room in OR.


* Will admit patient to observation same day surgery





10:25 AM - Daughter arrived on seen and said she is signing out her father AMA 

and taking him to [facility] and that the urologist will meet him at [facility].

 I explained risks and wife still signed AMA papers but did not wait for 

discharge papers.











Disposition





- Disposition


Disposition: AGAINST MEDICAL ADVICE


Disposition Time: 09:19


Condition: FAIR





- POA


Present On Arrival: None





- Clinical Impression


Clinical Impression: 


 Urinary retention due to benign prostatic hyperplasia








- Scribe Statement


The provider has reviewed the documentation as recorded by the Scribe





Latanya Bowser 





All medical record entries made by the Gloryibe were at my direction and 

personally dictated by me. I have reviewed the chart and agree that the record 

accurately reflects my personal performance of the history, physical exam, 

medical decision making, and the department course for this patient. I have also

personally directed, reviewed, and agree with the discharge instructions and 

disposition.





Decision To Admit





- Pt Status Changed To:


Hospital Disposition Of: [facility]- Endo,OR,Cath,IR





- .


Bed Request Type: Same Day Surgery


Admitting Physician: Reginaldo Bustillos


Patient Diagnosis: 


 Urinary retention due to benign prostatic hyperplasia

## 2019-04-08 NOTE — CARD
--------------- APPROVED REPORT --------------





Date of service: 04/07/2019



EKG Measurement

Heart [id number]

OR [id number]

[id number]

[id number]

[id number]



<Conclusion>

Normal sinus rhythm

Normal ECG